# Patient Record
Sex: MALE | Race: WHITE | NOT HISPANIC OR LATINO | URBAN - METROPOLITAN AREA
[De-identification: names, ages, dates, MRNs, and addresses within clinical notes are randomized per-mention and may not be internally consistent; named-entity substitution may affect disease eponyms.]

---

## 2018-02-20 ENCOUNTER — OUTPATIENT (OUTPATIENT)
Dept: OUTPATIENT SERVICES | Facility: HOSPITAL | Age: 68
LOS: 1 days | End: 2018-02-20
Payer: MEDICARE

## 2018-02-20 VITALS
SYSTOLIC BLOOD PRESSURE: 133 MMHG | TEMPERATURE: 98 F | HEIGHT: 68 IN | RESPIRATION RATE: 18 BRPM | DIASTOLIC BLOOD PRESSURE: 75 MMHG | WEIGHT: 156.97 LBS | HEART RATE: 57 BPM

## 2018-02-20 DIAGNOSIS — J34.2 DEVIATED NASAL SEPTUM: ICD-10-CM

## 2018-02-20 DIAGNOSIS — Z01.818 ENCOUNTER FOR OTHER PREPROCEDURAL EXAMINATION: ICD-10-CM

## 2018-02-20 DIAGNOSIS — J34.89 OTHER SPECIFIED DISORDERS OF NOSE AND NASAL SINUSES: ICD-10-CM

## 2018-02-20 LAB
ANION GAP SERPL CALC-SCNC: 5 MMOL/L — SIGNIFICANT CHANGE UP (ref 5–17)
APTT BLD: 32.5 SEC — SIGNIFICANT CHANGE UP (ref 27.5–37.4)
BUN SERPL-MCNC: 21 MG/DL — SIGNIFICANT CHANGE UP (ref 7–23)
CALCIUM SERPL-MCNC: 9 MG/DL — SIGNIFICANT CHANGE UP (ref 8.5–10.1)
CHLORIDE SERPL-SCNC: 106 MMOL/L — SIGNIFICANT CHANGE UP (ref 96–108)
CO2 SERPL-SCNC: 29 MMOL/L — SIGNIFICANT CHANGE UP (ref 22–31)
CREAT SERPL-MCNC: 1.1 MG/DL — SIGNIFICANT CHANGE UP (ref 0.5–1.3)
GLUCOSE SERPL-MCNC: 101 MG/DL — HIGH (ref 70–99)
HCT VFR BLD CALC: 39.8 % — SIGNIFICANT CHANGE UP (ref 39–50)
HCV AB S/CO SERPL IA: 0.1 S/CO — SIGNIFICANT CHANGE UP
HCV AB SERPL-IMP: SIGNIFICANT CHANGE UP
HGB BLD-MCNC: 13.1 G/DL — SIGNIFICANT CHANGE UP (ref 13–17)
INR BLD: 1.03 RATIO — SIGNIFICANT CHANGE UP (ref 0.88–1.16)
MCHC RBC-ENTMCNC: 33 GM/DL — SIGNIFICANT CHANGE UP (ref 32–36)
MCHC RBC-ENTMCNC: 33.6 PG — SIGNIFICANT CHANGE UP (ref 27–34)
MCV RBC AUTO: 102 FL — HIGH (ref 80–100)
PLATELET # BLD AUTO: 222 K/UL — SIGNIFICANT CHANGE UP (ref 150–400)
POTASSIUM SERPL-MCNC: 4.6 MMOL/L — SIGNIFICANT CHANGE UP (ref 3.5–5.3)
POTASSIUM SERPL-SCNC: 4.6 MMOL/L — SIGNIFICANT CHANGE UP (ref 3.5–5.3)
PROTHROM AB SERPL-ACNC: 11.2 SEC — SIGNIFICANT CHANGE UP (ref 9.8–12.7)
RBC # BLD: 3.9 M/UL — LOW (ref 4.2–5.8)
RBC # FLD: 12.8 % — SIGNIFICANT CHANGE UP (ref 10.3–14.5)
SODIUM SERPL-SCNC: 140 MMOL/L — SIGNIFICANT CHANGE UP (ref 135–145)
WBC # BLD: 5.5 K/UL — SIGNIFICANT CHANGE UP (ref 3.8–10.5)
WBC # FLD AUTO: 5.5 K/UL — SIGNIFICANT CHANGE UP (ref 3.8–10.5)

## 2018-02-20 PROCEDURE — 85610 PROTHROMBIN TIME: CPT

## 2018-02-20 PROCEDURE — 86803 HEPATITIS C AB TEST: CPT

## 2018-02-20 PROCEDURE — 85730 THROMBOPLASTIN TIME PARTIAL: CPT

## 2018-02-20 PROCEDURE — G0463: CPT

## 2018-02-20 PROCEDURE — 85027 COMPLETE CBC AUTOMATED: CPT

## 2018-02-20 PROCEDURE — 80048 BASIC METABOLIC PNL TOTAL CA: CPT

## 2018-02-20 NOTE — H&P PST ADULT - HISTORY OF PRESENT ILLNESS
This is a 67 year old male with no significant PMH who presents today for pre-surgical testing for a deviated Nasal septum repair. has Septoplasty- Bilateral sub-mucus resection- turbinates- nasal valve repair, open reduction nasal fracture with Dr Loera planned on 02/27/18. This is a 67 year old male with no significant PMH who presents today for pre-surgical testing for a deviated Nasal septum repair. has Septoplasty- Bilateral sub-mucus resection- turbinates- nasal valve repair, open reduction nasal fracture with Dr Loera planned on 02/27/18.  Reports that he had a nasal fracture when his sick son's head came in contact with his nose few years ago. Off  late he was noticing having difficulty breathing with nasal congestion, went to see ENT and CT scan was done and was scheduled for surgery on 02/27/18.

## 2018-02-20 NOTE — H&P PST ADULT - NEGATIVE OPHTHALMOLOGIC SYMPTOMS
no loss of vision L/no diplopia/no photophobia/no blurred vision R/no scleral injection R/no lacrimation L/no lacrimation R/no blurred vision L/no discharge L/no pain R/no irritation L/no pain L/no scleral injection L/no irritation R/no loss of vision R

## 2018-02-20 NOTE — H&P PST ADULT - PSH
S/P appendectomy  50 years ago  S/P colonoscopy    S/P hernia repair  50 years ago  S/P wrist surgery  Left wrist

## 2018-02-20 NOTE — H&P PST ADULT - NEGATIVE NEUROLOGICAL SYMPTOMS
no focal seizures/no syncope/no tremors/no headache/no confusion/no transient paralysis/no weakness/no paresthesias/no generalized seizures/no vertigo/no loss of sensation/no difficulty walking/no loss of consciousness/no hemiparesis/no facial palsy

## 2018-02-20 NOTE — H&P PST ADULT - PROBLEM SELECTOR PLAN 2
Labs- CBC, BMP, PT/PTT/INR ordered.   Medical Clearance with Dr not advised at this time. Instructed patient that if the results come back abnormal, he will  have to get clearance from MD.   Pre op instructions reviewed and given.   Avoid NSAIDs and OTC supplements. Verbalized understanding

## 2018-02-20 NOTE — H&P PST ADULT - NEGATIVE GENERAL SYMPTOMS
no sweating/no anorexia/no chills/no weight gain/no polyuria/no polyphagia/no fatigue/no weight loss/no fever/no polydipsia/no malaise

## 2018-02-20 NOTE — H&P PST ADULT - NEGATIVE GENERAL GENITOURINARY SYMPTOMS
no flank pain L/no dysuria/no incontinence/normal urinary frequency/no renal colic/no nocturia/normal libido/no urine discoloration/no gas in urine/no hematuria/no flank pain R/no bladder infections/no urinary hesitancy

## 2018-02-20 NOTE — H&P PST ADULT - NEGATIVE PSYCHIATRIC SYMPTOMS
no paranoia/no auditory hallucinations/no suicidal ideation/no anxiety/no memory loss/no agitation/no visual hallucinations/no hyperactivity/no depression/no insomnia/no mood swings

## 2018-02-20 NOTE — H&P PST ADULT - ASSESSMENT
This is a 67 year old male with no significant PMH who presents today for pre-surgical testing for a deviated Nasal septum repair. has Septoplasty- Bilateral sub-mucus resection- turbinates- nasal valve repair, open reduction nasal fracture with Dr Loera planned on 02/27/18.

## 2018-02-20 NOTE — H&P PST ADULT - NEGATIVE MUSCULOSKELETAL SYMPTOMS
no arthritis/no arthralgia/no back pain/no muscle weakness/no stiffness/no neck pain/no arm pain L/no arm pain R/no leg pain R/no joint swelling/no myalgia/no leg pain L

## 2018-02-20 NOTE — H&P PST ADULT - NEGATIVE ENMT SYMPTOMS
no gum bleeding/no throat pain/no ear pain/no tinnitus/no nasal discharge/no dysphagia/no abnormal taste sensation/no dry mouth/no hearing difficulty/no nose bleeds/no vertigo

## 2018-02-20 NOTE — H&P PST ADULT - NEGATIVE CARDIOVASCULAR SYMPTOMS
no claudication/no palpitations/no dyspnea on exertion/no chest pain/no peripheral edema/no orthopnea/no paroxysmal nocturnal dyspnea

## 2018-02-20 NOTE — H&P PST ADULT - PROBLEM SELECTOR PLAN 1
has Septoplasty- Bilateral sub-mucus resection- turbinates- nasal valve repair, open reduction nasal fracture with Dr Loera planned on 02/27/18.

## 2018-02-20 NOTE — H&P PST ADULT - RS GEN PE MLT RESP DETAILS PC
clear to auscultation bilaterally/no intercostal retractions/no rales/respirations non-labored/no rhonchi/no wheezes/airway patent/no chest wall tenderness/good air movement/normal/breath sounds equal

## 2018-02-20 NOTE — H&P PST ADULT - NEGATIVE BREAST SYMPTOMS
no breast lump L/no nipple discharge L/no nipple discharge R/no breast tenderness L/no breast lump R/no breast tenderness R

## 2018-02-20 NOTE — H&P PST ADULT - NEGATIVE GASTROINTESTINAL SYMPTOMS
no change in bowel habits/no nausea/no melena/no jaundice/no diarrhea/no vomiting/no constipation/no abdominal pain/no flatulence/no steatorrhea/no hematochezia/no hiccoughs

## 2018-02-20 NOTE — H&P PST ADULT - NEGATIVE SKIN SYMPTOMS
no rash/no itching/no hair loss/no dryness/no brittle nails/no change in size/color of mole/no tumor/no pitted nails

## 2018-02-27 ENCOUNTER — OUTPATIENT (OUTPATIENT)
Dept: OUTPATIENT SERVICES | Facility: HOSPITAL | Age: 68
LOS: 1 days | End: 2018-02-27
Payer: MEDICARE

## 2018-02-27 VITALS
HEIGHT: 68 IN | TEMPERATURE: 98 F | OXYGEN SATURATION: 99 % | RESPIRATION RATE: 14 BRPM | SYSTOLIC BLOOD PRESSURE: 147 MMHG | DIASTOLIC BLOOD PRESSURE: 88 MMHG | WEIGHT: 156.97 LBS | HEART RATE: 51 BPM

## 2018-02-27 VITALS
SYSTOLIC BLOOD PRESSURE: 128 MMHG | DIASTOLIC BLOOD PRESSURE: 80 MMHG | OXYGEN SATURATION: 98 % | RESPIRATION RATE: 12 BRPM | HEART RATE: 70 BPM

## 2018-02-27 DIAGNOSIS — J34.89 OTHER SPECIFIED DISORDERS OF NOSE AND NASAL SINUSES: ICD-10-CM

## 2018-02-27 DIAGNOSIS — J34.2 DEVIATED NASAL SEPTUM: ICD-10-CM

## 2018-02-27 PROCEDURE — 88311 DECALCIFY TISSUE: CPT | Mod: 26

## 2018-02-27 PROCEDURE — 30465 REPAIR NASAL STENOSIS: CPT

## 2018-02-27 PROCEDURE — 88311 DECALCIFY TISSUE: CPT

## 2018-02-27 PROCEDURE — 30520 REPAIR OF NASAL SEPTUM: CPT

## 2018-02-27 PROCEDURE — 30140 RESECT INFERIOR TURBINATE: CPT | Mod: 50

## 2018-02-27 PROCEDURE — 88304 TISSUE EXAM BY PATHOLOGIST: CPT

## 2018-02-27 PROCEDURE — 88304 TISSUE EXAM BY PATHOLOGIST: CPT | Mod: 26

## 2018-02-27 PROCEDURE — C1889: CPT

## 2018-02-27 RX ORDER — SODIUM CHLORIDE 9 MG/ML
1000 INJECTION, SOLUTION INTRAVENOUS
Qty: 0 | Refills: 0 | Status: DISCONTINUED | OUTPATIENT
Start: 2018-02-27 | End: 2018-02-27

## 2018-02-27 RX ORDER — HYDROMORPHONE HYDROCHLORIDE 2 MG/ML
0.5 INJECTION INTRAMUSCULAR; INTRAVENOUS; SUBCUTANEOUS
Qty: 0 | Refills: 0 | Status: DISCONTINUED | OUTPATIENT
Start: 2018-02-27 | End: 2018-02-27

## 2018-02-27 RX ORDER — TRAMADOL HYDROCHLORIDE 50 MG/1
1 TABLET ORAL
Qty: 30 | Refills: 0 | OUTPATIENT
Start: 2018-02-27 | End: 2018-03-03

## 2018-02-27 RX ORDER — ONDANSETRON 8 MG/1
4 TABLET, FILM COATED ORAL ONCE
Qty: 0 | Refills: 0 | Status: DISCONTINUED | OUTPATIENT
Start: 2018-02-27 | End: 2018-02-27

## 2018-02-27 RX ORDER — OXYCODONE HYDROCHLORIDE 5 MG/1
5 TABLET ORAL EVERY 4 HOURS
Qty: 0 | Refills: 0 | Status: DISCONTINUED | OUTPATIENT
Start: 2018-02-27 | End: 2018-02-27

## 2018-02-27 RX ORDER — CEFAZOLIN SODIUM 1 G
2000 VIAL (EA) INJECTION ONCE
Qty: 0 | Refills: 0 | Status: COMPLETED | OUTPATIENT
Start: 2018-02-27 | End: 2018-02-27

## 2018-02-27 RX ADMIN — SODIUM CHLORIDE 100 MILLILITER(S): 9 INJECTION, SOLUTION INTRAVENOUS at 15:02

## 2018-02-27 RX ADMIN — SODIUM CHLORIDE 60 MILLILITER(S): 9 INJECTION, SOLUTION INTRAVENOUS at 07:48

## 2018-02-27 NOTE — BRIEF OPERATIVE NOTE - PRE-OP DX
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

## 2018-02-27 NOTE — BRIEF OPERATIVE NOTE - PROCEDURE
<<-----Click on this checkbox to enter Procedure Nasal reconstruction with septoplasty and reduction of turbinate  02/27/2018    [facility]  Sept[facility] with bilateral reduction of nasal turbinates  02/27/2018    [facility]

## 2018-02-27 NOTE — ASU DISCHARGE PLAN (ADULT/PEDIATRIC). - MEDICATION SUMMARY - MEDICATIONS TO TAKE
I will START or STAY ON the medications listed below when I get home from the hospital:    Ultram 50 mg oral tablet  -- 1 tab(s) by mouth every 4 hours, As Needed -for severe pain MDD:6 per day   -- Caution federal law prohibits the transfer of this drug to any person other  than the person for whom it was prescribed.  May cause drowsiness.  Alcohol may intensify this effect.  Use care when operating dangerous machinery.  Obtain medical advice before taking any non-prescription drugs as some may affect the action of this medication.    -- Indication: For Deviated nasal septum    cefadroxil 500 mg oral capsule  -- 1 cap(s) by mouth every 12 hours   -- Finish all this medication unless otherwise directed by prescriber.    -- Indication: For Other specified disorders of nose and paranasal sinuses

## 2018-02-27 NOTE — ASU DISCHARGE PLAN (ADULT/PEDIATRIC). - NOTIFY
Bleeding that does not stop/Unable to Urinate/Persistent Nausea and Vomiting/Swelling that continues/Numbness, color, or temperature change to extremity

## 2018-02-28 LAB — SURGICAL PATHOLOGY FINAL REPORT - CH: SIGNIFICANT CHANGE UP

## 2025-03-15 NOTE — BRIEF OPERATIVE NOTE - PRE-OP DX
Closed fracture of nasal bone, sequela  02/27/2018    Donavan Read  Deviated nasal septum  02/27/2018    Donavan Read  Nasal valve stenosis  02/27/2018    Donavan Read  Turbinoseptal adhesions  02/27/2018    Donavan Read Suicidal ideation